# Patient Record
Sex: FEMALE | URBAN - NONMETROPOLITAN AREA
[De-identification: names, ages, dates, MRNs, and addresses within clinical notes are randomized per-mention and may not be internally consistent; named-entity substitution may affect disease eponyms.]

---

## 2022-11-14 ENCOUNTER — APPOINTMENT (OUTPATIENT)
Age: 67
Setting detail: DERMATOLOGY
End: 2022-11-23

## 2022-11-14 PROBLEM — C44.629 SQUAMOUS CELL CARCINOMA OF SKIN OF LEFT UPPER LIMB, INCLUDING SHOULDER: Status: ACTIVE | Noted: 2022-11-14

## 2022-11-14 PROCEDURE — OTHER CONSULTATION FOR MOHS SURGERY: OTHER

## 2022-11-14 PROCEDURE — OTHER REFERRAL: OTHER

## 2022-11-14 PROCEDURE — 99203 OFFICE O/P NEW LOW 30 MIN: CPT

## 2022-11-14 PROCEDURE — OTHER ADDITIONAL NOTES: OTHER

## 2022-11-14 PROCEDURE — OTHER MIPS QUALITY: OTHER

## 2022-11-14 PROCEDURE — OTHER COUNSELING: OTHER

## 2022-11-14 PROCEDURE — OTHER REASSURANCE: OTHER

## 2022-11-14 NOTE — HPI: SKIN LESION (SQUAMOUS CELL CARCINOMA)
How Severe Is Your Skin Cancer?: moderate
Is This A New Presentation, Or A Follow-Up?: Squamous Cell Carcinoma
Additional History: Patient was referred by Kenzie Gomez for treatment of SCC to the left forearm. Patient states she has a history of BCC to her right shoulder.
Location From Outside Provider (Will Override Previously Chosen Location): Left forearm
When Was Squamous Cell Carcinoma Biopsied? (Optional): 10/12/22 by ULYSSES Celeste

## 2022-11-14 NOTE — PROCEDURE: ADDITIONAL NOTES
Additional Notes: Patient was referred by Kenzie Gomez from [facility] for a consult for treatment to left forearm (pathology is in attachments) Additional Notes: Patient was referred by Kenzie Gomez from [facility] for a consult for treatment to left forearm (pathology is in attachments)

## 2022-11-14 NOTE — PROCEDURE: MIPS QUALITY
Detail Level: Detailed
Quality 226: Preventive Care And Screening: Tobacco Use: Screening And Cessation Intervention: Patient screened for tobacco use and is an ex/non-smoker
Quality 110: Preventive Care And Screening: Influenza Immunization: Influenza Immunization previously received during influenza season
Quality 431: Preventive Care And Screening: Unhealthy Alcohol Use - Screening: Patient not identified as an unhealthy alcohol user when screened for unhealthy alcohol use using a systematic screening method
Quality 111:Pneumonia Vaccination Status For Older Adults: Pneumococcal vaccine (PPSV23) was not administered on or after patient’s 60th birthday and before the end of the measurement period, reason not otherwise specified

## 2022-11-22 ENCOUNTER — APPOINTMENT (OUTPATIENT)
Age: 67
Setting detail: DERMATOLOGY
End: 2022-11-28

## 2022-11-22 ENCOUNTER — APPOINTMENT (OUTPATIENT)
Age: 67
Setting detail: DERMATOLOGY
End: 2022-11-22

## 2022-11-22 PROBLEM — C44.629 SQUAMOUS CELL CARCINOMA OF SKIN OF LEFT UPPER LIMB, INCLUDING SHOULDER: Status: ACTIVE | Noted: 2022-11-22

## 2022-11-22 PROCEDURE — 17313 MOHS 1 STAGE T/A/L: CPT

## 2022-11-22 PROCEDURE — OTHER REFERRAL: OTHER

## 2022-11-22 PROCEDURE — OTHER MOHS SURGERY: OTHER

## 2022-11-22 PROCEDURE — 13121 CMPLX RPR S/A/L 2.6-7.5 CM: CPT

## 2022-11-22 PROCEDURE — OTHER REPAIR NOTE: OTHER

## 2022-11-22 NOTE — PROCEDURE: REPAIR NOTE
Burow's Advancement Flap Text: The defect edges were debeveled with a #15 scalpel blade.  Given the location of the defect and the proximity to free margins a Burow's advancement flap was deemed most appropriate.  Using a sterile surgical marker, the appropriate advancement flap was drawn incorporating the defect and placing the expected incisions within the relaxed skin tension lines where possible.    The area thus outlined was incised deep to adipose tissue with a #15 scalpel blade.  The skin margins were undermined to an appropriate distance in all directions utilizing iris scissors. Yes

## 2022-12-06 ENCOUNTER — APPOINTMENT (OUTPATIENT)
Age: 67
Setting detail: DERMATOLOGY
End: 2022-12-13

## 2022-12-06 DIAGNOSIS — Z48.02 ENCOUNTER FOR REMOVAL OF SUTURES: ICD-10-CM

## 2022-12-06 PROCEDURE — 99024 POSTOP FOLLOW-UP VISIT: CPT

## 2022-12-06 PROCEDURE — OTHER SUTURE REMOVAL (GLOBAL PERIOD): OTHER

## 2022-12-06 ASSESSMENT — LOCATION SIMPLE DESCRIPTION DERM: LOCATION SIMPLE: LEFT FOREARM

## 2022-12-06 ASSESSMENT — LOCATION ZONE DERM: LOCATION ZONE: ARM

## 2022-12-06 ASSESSMENT — LOCATION DETAILED DESCRIPTION DERM: LOCATION DETAILED: LEFT DISTAL DORSAL FOREARM

## 2022-12-06 NOTE — PROCEDURE: SUTURE REMOVAL (GLOBAL PERIOD)
Detail Level: Detailed
Add 97157 Cpt? (Important Note: In 2017 The Use Of 82103 Is Being Tracked By Cms To Determine Future Global Period Reimbursement For Global Periods): yes